# Patient Record
(demographics unavailable — no encounter records)

---

## 2024-11-01 NOTE — HISTORY OF PRESENT ILLNESS
[Mother] : mother [Fruit] : fruit [Vegetables] : vegetables [Meat] : meat [Eggs] : eggs [Dairy] : dairy [Eats healthy meals and snacks] : eats healthy meals and snacks [Eats meals with family] : eats meals with family [Toilet Trained] : toilet trained [Normal] : Normal [In own bed] : In own bed [Sleeps ___ hours per night] : sleeps [unfilled] hours per night [Brushing teeth twice/d] : brushing teeth twice per day [Yes] : Patient goes to dentist yearly [Grade ___] : Grade [unfilled] [Adequate social interactions] : adequate social interactions [Adequate behavior] : adequate behavior [Adequate performance] : adequate performance [Adequate attention] : adequate attention [No difficulties with Homework] : no difficulties with homework [Toothpaste] : Primary Fluoride Source: Toothpaste [Playtime (60 min/d)] : playtime 60 min a day [Participates in after-school activities] : participates in after-school activities [Appropiate parent-child-sibling interaction] : appropriate parent-child-sibling interaction [Has Friends] : has friends [No] : No cigarette smoke exposure [Appropriately restrained in motor vehicle] : appropriately restrained in motor vehicle [Supervised outdoor play] : supervised outdoor play [Monitored computer use] : monitored computer use [Up to date] : Up to date [NO] : No [de-identified] : She is seeing nutritionist through her school [FreeTextEntry1] :  - Pt with sound sensitivity - she covers her ears and gets upset and cannot focus in school when there are loud sounds or kids are talking too loud/screaming. Mom also notices that she gets upset when TV is too loud

## 2024-11-01 NOTE — DISCUSSION/SUMMARY
[School] : school [Development and Mental Health] : development and mental health [Nutrition and Physical Activity] : nutrition and physical activity [Oral Health] : oral health [Safety] : safety [Patient] : patient [Parent/Guardian] : parent/guardian [] : The components of the vaccine(s) to be administered today are listed in the plan of care. The disease(s) for which the vaccine(s) are intended to prevent and the risks have been discussed with the caretaker.  The risks are also included in the appropriate vaccination information statements which have been provided to the patient's caregiver.  The caregiver has given consent to vaccinate. [FreeTextEntry1] :  8 year old healthy female presenting for well visit Tracking well along growth curves and meeting all age-appropriate developmental milestones   BMI elevated however she is seeing a nutritionist.  Reviewed healthy eating habits, healthy snacking, switching to low-fat milk, limiting intake of sweetened beverages, eating well-balanced meals/snacks from all major food groups, and importance of at least 30-60 minutes of physical activity per day   Continue balanced diet with all food groups. Brush teeth twice a day with toothbrush. Recommend visit to dentist. Help child to maintain consistent daily routines and sleep schedule. School discussed. Ensure home is safe. Teach child about personal safety. Use consistent, positive discipline. Limit screen time to no more than 2 hours per day. Encourage physical activity. Child needs to ride in a belt-positioning booster seat until  4 feet 9 inches has been reached and are between 8 and 12 years of age.   - Parent declined flu vaccine at today's visit  - Referral given to Audiology for sound sensitivity - Mom concerned about chronic rash on face - possibly keratosis pilaris - referred to Dermatology   Return 1 year for routine well child check.

## 2024-11-01 NOTE — PHYSICAL EXAM
[Alert] : alert [No Acute Distress] : no acute distress [Normocephalic] : normocephalic [Conjunctivae with no discharge] : conjunctivae with no discharge [PERRL] : PERRL [EOMI Bilateral] : EOMI bilateral [Auricles Well Formed] : auricles well formed [Clear Tympanic membranes with present light reflex and bony landmarks] : clear tympanic membranes with present light reflex and bony landmarks [No Discharge] : no discharge [Nares Patent] : nares patent [Pink Nasal Mucosa] : pink nasal mucosa [Palate Intact] : palate intact [Nonerythematous Oropharynx] : nonerythematous oropharynx [Supple, full passive range of motion] : supple, full passive range of motion [No Palpable Masses] : no palpable masses [Symmetric Chest Rise] : symmetric chest rise [Clear to Auscultation Bilaterally] : clear to auscultation bilaterally [Regular Rate and Rhythm] : regular rate and rhythm [Normal S1, S2 present] : normal S1, S2 present [No Murmurs] : no murmurs [+2 Femoral Pulses] : +2 femoral pulses [Soft] : soft [NonTender] : non tender [Non Distended] : non distended [Normoactive Bowel Sounds] : normoactive bowel sounds [No Hepatomegaly] : no hepatomegaly [No Splenomegaly] : no splenomegaly [Jaylen: ____] : Jaylen [unfilled] [Jaylen: _____] : Jaylen [unfilled] [Patent] : patent [No fissures] : no fissures [No Abnormal Lymph Nodes Palpated] : no abnormal lymph nodes palpated [No Gait Asymmetry] : no gait asymmetry [No pain or deformities with palpation of bone, muscles, joints] : no pain or deformities with palpation of bone, muscles, joints [Normal Muscle Tone] : normal muscle tone [Straight] : straight [+2 Patella DTR] : +2 patella DTR [Cranial Nerves Grossly Intact] : cranial nerves grossly intact [de-identified] : +rough bumpy rash on upper arms b/l, cheeks, and sides of face with mild erythema

## 2024-11-04 NOTE — CONSULT LETTER
[Dear  ___] : Dear  [unfilled], [Courtesy Letter:] : I had the pleasure of seeing your patient, [unfilled], in my office today. [Sincerely,] : Sincerely, [FreeTextEntry2] : Dr Justin Escobedo [address] [place], [address] [FreeTextEntry3] : Eladio Archer MD Chief, Pediatric Otolaryngology [facility] and [facility] Professor of Otolaryngology [employer] at [facility]

## 2024-11-04 NOTE — REASON FOR VISIT
[Initial Evaluation] : an initial evaluation for [Parents] : parents [FreeTextEntry2] : sensitivity to loud noises

## 2024-11-04 NOTE — HISTORY OF PRESENT ILLNESS
[No Personal or Family History of Easy Bruising, Bleeding, or Issues with General Anesthesia] : No Personal or Family History of easy bruising, bleeding, or issues with general anesthesia [de-identified] : 8 year old female presents for sensitivity to loud noises Referred by pediatrician. Went for well visit and noticed that patient had a sensitivity to loud volume while doing hearing test. Parents have concerns that she does have a sensitivity to loud noises, otherwise no hearing concerns. No ear infections. No nasal congestion. No snoring.  No speech delay.  Passed [facility]

## 2025-05-13 NOTE — HISTORY OF PRESENT ILLNESS
[FreeTextEntry6] :  Patient was well until yesterday with right ear pain, fever to 101F last night, gave one dose of Ibuprofen. + congestion Patient is active, playful, normal appetite, urinating and stooling well no V/D/abd pain/rash, no sore throat, + ear pain, no difficulty breathing no ill contact no recent travel